# Patient Record
Sex: MALE | Race: WHITE | NOT HISPANIC OR LATINO | Employment: FULL TIME | URBAN - METROPOLITAN AREA
[De-identification: names, ages, dates, MRNs, and addresses within clinical notes are randomized per-mention and may not be internally consistent; named-entity substitution may affect disease eponyms.]

---

## 2025-04-09 ENCOUNTER — APPOINTMENT (OUTPATIENT)
Dept: PRIMARY CARE | Facility: CLINIC | Age: 25
End: 2025-04-09
Payer: COMMERCIAL

## 2025-04-09 VITALS
SYSTOLIC BLOOD PRESSURE: 136 MMHG | HEART RATE: 71 BPM | DIASTOLIC BLOOD PRESSURE: 82 MMHG | OXYGEN SATURATION: 99 % | WEIGHT: 224 LBS | HEIGHT: 74 IN | BODY MASS INDEX: 28.75 KG/M2

## 2025-04-09 DIAGNOSIS — R03.0 ELEVATED BP WITHOUT DIAGNOSIS OF HYPERTENSION: ICD-10-CM

## 2025-04-09 DIAGNOSIS — S76.219A GROIN STRAIN, UNSPECIFIED LATERALITY, INITIAL ENCOUNTER: ICD-10-CM

## 2025-04-09 DIAGNOSIS — Z00.00 ANNUAL PHYSICAL EXAM: Primary | ICD-10-CM

## 2025-04-09 DIAGNOSIS — Z13.220 LIPID SCREENING: ICD-10-CM

## 2025-04-09 PROCEDURE — 99385 PREV VISIT NEW AGE 18-39: CPT | Performed by: STUDENT IN AN ORGANIZED HEALTH CARE EDUCATION/TRAINING PROGRAM

## 2025-04-09 PROCEDURE — 3008F BODY MASS INDEX DOCD: CPT | Performed by: STUDENT IN AN ORGANIZED HEALTH CARE EDUCATION/TRAINING PROGRAM

## 2025-04-09 PROCEDURE — 1036F TOBACCO NON-USER: CPT | Performed by: STUDENT IN AN ORGANIZED HEALTH CARE EDUCATION/TRAINING PROGRAM

## 2025-04-09 PROCEDURE — 99203 OFFICE O/P NEW LOW 30 MIN: CPT | Performed by: STUDENT IN AN ORGANIZED HEALTH CARE EDUCATION/TRAINING PROGRAM

## 2025-04-09 ASSESSMENT — PATIENT HEALTH QUESTIONNAIRE - PHQ9
2. FEELING DOWN, DEPRESSED OR HOPELESS: NOT AT ALL
1. LITTLE INTEREST OR PLEASURE IN DOING THINGS: NOT AT ALL
SUM OF ALL RESPONSES TO PHQ9 QUESTIONS 1 & 2: 0

## 2025-04-09 NOTE — PROGRESS NOTES
Subjective   Patient ID: Ever Daniel is a 24 y.o. male who presents for the following    Assessment/Plan   Preventative Medicine  -UTD on childhood vaccines  -Does not get yearly flu or COVID shot   -PHQ2: 0   -Alcohol screening done     Overweight (BMI 28)  -During our session, we discussed your weight loss goals and the importance of maintaining a calorie deficit to achieve these. I recommend tracking your daily calorie intake to help you stay within a target that supports weight loss. Incorporating at least 150 minutes of aerobic exercise per week, such as brisk walking, jogging, or cycling, will further aid your efforts. We will revisit your progress during our next appointment and make any necessary adjustments to your plan.    Elevated BP without Dx of HTN  -Family hx of HTN  PLAN  -Ambulatory monitoring recommending. Will message us in 1 week with BP readings. Will consider medication if BP remains high.   -Low salt diet recommended  -Decrease caffeine intake.     Hernia Evaluation   -No inguinal hernia  -no scrotal hernia  -No testicular abnormalities on physical exam  PLAN  -Wear support belt and jock strap when squatting  -Avoid squatting for the next 1-2 weeks  -Likely groin strain      HPI  24M presents to establish care and acute sick visit.     Wants to have a hernia evaluation.. States that he has groin soreness especially after doing squats. Does not notice any bulging or masses. No urinary complaints. No trouble getting/maintaining erection.     Otherwise doing okay.     Preventative care discussed.     Denies fevers, chills, weight loss, lightheadedness, dizziness, vision changes, sore throat, runny nose, CP, SOB, cough, palpitations, n/v/d, abd pain, black/bloody stools, arthralgias, mood disturbance, or new numbness/weakness/tingling in arms/legs/face.      General Health  -Diet is pretty healthy. Trying to lose 5-7lbs   -Exercises routinely       Social Hx   T: denies  A: occasional  D:  "denies    Personal Hx  -Works for [employer]   -  -No kids           Past Medical History:   Diagnosis Date    Cervicalgia 2020    Discomfort of neck    Follicular cyst of the skin and subcutaneous tissue, unspecified 2020    Subcutaneous cyst    Other specified disorders of bone, other site 2020    Mass of spine    Pain, unspecified 2020    Body aches    Personal history of other specified conditions 2020    History of fever       Past Surgical History:   Procedure Laterality Date    OTHER SURGICAL HISTORY  2020    Corneal lasik       FAMILY HISTORY   Problem Relation Age of Onset    Hypertension Father    Lipids Father    Cancer Paternal Grandmother -     No Ocular Disease    Social Drivers of Health     Tobacco Use: Low Risk  (2025)    Patient History     Smoking Tobacco Use: Never     Smokeless Tobacco Use: Never     Passive Exposure: Not on file   Alcohol Use: Not on file   Financial Resource Strain: Not on file   Food Insecurity: Not on file   Transportation Needs: Not on file   Physical Activity: Not on file   Stress: Not on file   Social Connections: Not on file   Intimate Partner Violence: Not on file   Depression: Not at risk (2025)    PHQ-2     PHQ-2 Score: 0   Housing Stability: Not on file   Utilities: Not on file   Digital Equity: Not on file   Health Literacy: Not on file         Visit Vitals  /82   Pulse 71   Ht 1.88 m (6' 2\")   Wt 102 kg (224 lb)   SpO2 99%   BMI 28.76 kg/m²   Smoking Status Never   BSA 2.31 m²     PHYSICAL EXAM   Physical Exam       General: NAD. NCAT. Aox3   HEENT: PERRLA. EOMI. MMM. Nares patent bl.  Cardiovascular: RRR. No MRG. S1/S2 wnl.   Respiratory: CTABL. No acute respiratory distress.   GI: Soft, NT abdomen. BS present x 4.   : No inguinal/femoral hernia BL. No testicular masses or abnormalities noted.   MSK: ROM x 4. CTLS non-tender.   Extremities: No edema. Cap refill < 2 sec.   Skin: No rashes or bruises. "   Neuro: Aox3. Cranial Nerves grossly intact. Motor/sensory wnl.   Psych: Mood wnl.      REVIEW OF SYSTEMS   ROS IN HPI     No Known Allergies    No current outpatient medications on file.     No current facility-administered medications for this visit.       Objective     No visits with results within 4 Month(s) from this visit.   Latest known visit with results is:   Legacy Encounter on 05/17/2021   Component Date Value Ref Range Status    Cholesterol 05/17/2021 194  0 - 199 mg/dL Final    HDL 05/17/2021 33.0 (A)  mg/dL Final    Cholesterol/HDL Ratio 05/17/2021 5.9 (A)   Final    LDL 05/17/2021 143 (H)  0 - 119 mg/dL Final    VLDL 05/17/2021 18  0 - 40 mg/dL Final    Triglycerides 05/17/2021 91  0 - 149 mg/dL Final    Non HDL Cholesterol 05/17/2021 161 (H)  0 - 149 mg/dL Final       Radiology: Reviewed imaging in powerchart.  Imaging  No results found.    Cardiology, Vascular, and Other Imaging  No other imaging results found for the past 7 days      No family history on file.  Social History     Socioeconomic History    Marital status: Single   Tobacco Use    Smoking status: Never    Smokeless tobacco: Never   Substance and Sexual Activity    Alcohol use: Yes     Comment: occasional    Drug use: Never     Past Medical History:   Diagnosis Date    Cervicalgia 06/22/2020    Discomfort of neck    Follicular cyst of the skin and subcutaneous tissue, unspecified 06/09/2020    Subcutaneous cyst    Other specified disorders of bone, other site 03/19/2020    Mass of spine    Pain, unspecified 11/19/2020    Body aches    Personal history of other specified conditions 11/19/2020    History of fever     Past Surgical History:   Procedure Laterality Date    OTHER SURGICAL HISTORY  03/19/2020    Corneal lasik       Charting was completed using voice recognition technology and may include unintended errors.

## 2025-04-10 ENCOUNTER — TELEPHONE (OUTPATIENT)
Dept: PRIMARY CARE | Facility: CLINIC | Age: 25
End: 2025-04-10
Payer: COMMERCIAL

## 2025-04-10 DIAGNOSIS — R79.89 ELEVATED LFTS: ICD-10-CM

## 2025-04-10 NOTE — TELEPHONE ENCOUNTER
----- Message from Tono De Leon sent at 4/10/2025  8:19 AM EDT -----  Elevated lipids. Recommend low fat, low carb diet and 30 minutes of aerobic exercise at least 3 times weekly. Follow up in 6-12 months for repeat lipids.       Elevated LFT. Possibly fatty liver. Add on hepatitis panel and ASMA. Order RUQ US to look for liver disease. Avoid alcohol until workup complete.

## 2025-04-13 LAB
ALBUMIN SERPL-MCNC: 5.1 G/DL (ref 3.6–5.1)
ALP SERPL-CCNC: 58 U/L (ref 36–130)
ALT SERPL-CCNC: 68 U/L (ref 9–46)
ANION GAP SERPL CALCULATED.4IONS-SCNC: 9 MMOL/L (CALC) (ref 7–17)
AST SERPL-CCNC: 37 U/L (ref 10–40)
BASOPHILS # BLD AUTO: 32 CELLS/UL (ref 0–200)
BASOPHILS NFR BLD AUTO: 0.9 %
BILIRUB SERPL-MCNC: 0.5 MG/DL (ref 0.2–1.2)
BUN SERPL-MCNC: 14 MG/DL (ref 7–25)
CALCIUM SERPL-MCNC: 9.9 MG/DL (ref 8.6–10.3)
CHLORIDE SERPL-SCNC: 103 MMOL/L (ref 98–110)
CHOLEST SERPL-MCNC: 230 MG/DL
CHOLEST/HDLC SERPL: 6.1 (CALC)
CO2 SERPL-SCNC: 27 MMOL/L (ref 20–32)
CREAT SERPL-MCNC: 0.96 MG/DL (ref 0.6–1.24)
EGFRCR SERPLBLD CKD-EPI 2021: 112 ML/MIN/1.73M2
EOSINOPHIL # BLD AUTO: 172 CELLS/UL (ref 15–500)
EOSINOPHIL NFR BLD AUTO: 4.9 %
ERYTHROCYTE [DISTWIDTH] IN BLOOD BY AUTOMATED COUNT: 12.2 % (ref 11–15)
EST. AVERAGE GLUCOSE BLD GHB EST-MCNC: 111 MG/DL
EST. AVERAGE GLUCOSE BLD GHB EST-SCNC: 6.2 MMOL/L
GLUCOSE SERPL-MCNC: 95 MG/DL (ref 65–99)
HAV IGM SERPL QL IA: NORMAL
HBA1C MFR BLD: 5.5 % OF TOTAL HGB
HBV CORE IGM SERPL QL IA: NORMAL
HBV SURFACE AG SERPL QL IA: NORMAL
HCT VFR BLD AUTO: 46.1 % (ref 38.5–50)
HCV AB SERPL QL IA: NORMAL
HDLC SERPL-MCNC: 38 MG/DL
HGB BLD-MCNC: 15.1 G/DL (ref 13.2–17.1)
LDLC SERPL CALC-MCNC: 170 MG/DL (CALC)
LYMPHOCYTES # BLD AUTO: 987 CELLS/UL (ref 850–3900)
LYMPHOCYTES NFR BLD AUTO: 28.2 %
MCH RBC QN AUTO: 29 PG (ref 27–33)
MCHC RBC AUTO-ENTMCNC: 32.8 G/DL (ref 32–36)
MCV RBC AUTO: 88.7 FL (ref 80–100)
MONOCYTES # BLD AUTO: 280 CELLS/UL (ref 200–950)
MONOCYTES NFR BLD AUTO: 8 %
NEUTROPHILS # BLD AUTO: 2030 CELLS/UL (ref 1500–7800)
NEUTROPHILS NFR BLD AUTO: 58 %
NONHDLC SERPL-MCNC: 192 MG/DL (CALC)
PLATELET # BLD AUTO: 334 THOUSAND/UL (ref 140–400)
PMV BLD REES-ECKER: 9.5 FL (ref 7.5–12.5)
POTASSIUM SERPL-SCNC: 4.6 MMOL/L (ref 3.5–5.3)
PROT SERPL-MCNC: 7.8 G/DL (ref 6.1–8.1)
RBC # BLD AUTO: 5.2 MILLION/UL (ref 4.2–5.8)
SMA IGG SER-ACNC: <20 U
SODIUM SERPL-SCNC: 139 MMOL/L (ref 135–146)
TRIGL SERPL-MCNC: 98 MG/DL
TSH SERPL-ACNC: 2.62 MIU/L (ref 0.4–4.5)
WBC # BLD AUTO: 3.5 THOUSAND/UL (ref 3.8–10.8)

## 2025-04-14 ENCOUNTER — TELEPHONE (OUTPATIENT)
Dept: PRIMARY CARE | Facility: CLINIC | Age: 25
End: 2025-04-14
Payer: COMMERCIAL

## 2025-04-14 NOTE — TELEPHONE ENCOUNTER
----- Message from Tono De Leon sent at 4/13/2025 11:55 PM EDT -----  ASMA/Hepatitis Panel negative. RUQ US pending

## 2025-04-16 ENCOUNTER — TELEPHONE (OUTPATIENT)
Dept: PRIMARY CARE | Facility: CLINIC | Age: 25
End: 2025-04-16
Payer: COMMERCIAL

## 2025-04-16 NOTE — TELEPHONE ENCOUNTER
----- Message from Tono De Leon sent at 4/16/2025  1:38 PM EDT -----  Normal RUQ US   ----- Message -----  From: [facility] Sage - Imaging Results In  Sent: 4/16/2025  12:15 PM EDT  To: Tono De Leon MD